# Patient Record
Sex: FEMALE | Race: WHITE | Employment: FULL TIME | URBAN - METROPOLITAN AREA
[De-identification: names, ages, dates, MRNs, and addresses within clinical notes are randomized per-mention and may not be internally consistent; named-entity substitution may affect disease eponyms.]

---

## 2018-09-21 PROBLEM — Z34.00 SUPERVISION OF NORMAL FIRST PREGNANCY, ANTEPARTUM: Status: ACTIVE | Noted: 2018-09-21

## 2018-09-21 PROCEDURE — 86901 BLOOD TYPING SEROLOGIC RH(D): CPT | Performed by: OBSTETRICS & GYNECOLOGY

## 2018-09-21 PROCEDURE — 86780 TREPONEMA PALLIDUM: CPT | Performed by: OBSTETRICS & GYNECOLOGY

## 2018-09-21 PROCEDURE — 87086 URINE CULTURE/COLONY COUNT: CPT | Performed by: OBSTETRICS & GYNECOLOGY

## 2018-09-21 PROCEDURE — 86850 RBC ANTIBODY SCREEN: CPT | Performed by: OBSTETRICS & GYNECOLOGY

## 2018-09-21 PROCEDURE — 86900 BLOOD TYPING SEROLOGIC ABO: CPT | Performed by: OBSTETRICS & GYNECOLOGY

## 2018-09-21 PROCEDURE — 86762 RUBELLA ANTIBODY: CPT | Performed by: OBSTETRICS & GYNECOLOGY

## 2018-09-21 PROCEDURE — 87389 HIV-1 AG W/HIV-1&-2 AB AG IA: CPT | Performed by: OBSTETRICS & GYNECOLOGY

## 2018-12-27 PROBLEM — O35.8XX0 PYELECTASIS OF FETUS ON PRENATAL ULTRASOUND: Status: ACTIVE | Noted: 2018-12-27

## 2018-12-27 PROBLEM — O34.40 HISTORY OF LEEP (LOOP ELECTROSURGICAL EXCISION PROCEDURE) OF CERVIX COMPLICATING PREGNANCY: Status: ACTIVE | Noted: 2018-12-27

## 2018-12-27 PROBLEM — Z34.80 SUPERVISION OF OTHER NORMAL PREGNANCY, ANTEPARTUM: Status: ACTIVE | Noted: 2018-09-21

## 2018-12-27 PROBLEM — Z98.890 HISTORY OF LEEP (LOOP ELECTROSURGICAL EXCISION PROCEDURE) OF CERVIX COMPLICATING PREGNANCY: Status: ACTIVE | Noted: 2018-12-27

## 2019-02-22 PROCEDURE — 87389 HIV-1 AG W/HIV-1&-2 AB AG IA: CPT | Performed by: OBSTETRICS & GYNECOLOGY

## 2019-04-01 PROBLEM — O99.013 ANEMIA AFFECTING PREGNANCY IN THIRD TRIMESTER: Status: ACTIVE | Noted: 2019-04-01

## 2019-04-08 PROCEDURE — 87081 CULTURE SCREEN ONLY: CPT | Performed by: OBSTETRICS & GYNECOLOGY

## 2019-04-08 PROCEDURE — 87653 STREP B DNA AMP PROBE: CPT | Performed by: OBSTETRICS & GYNECOLOGY

## 2019-04-30 PROBLEM — Z98.890 HISTORY OF LEEP (LOOP ELECTROSURGICAL EXCISION PROCEDURE) OF CERVIX COMPLICATING PREGNANCY: Status: RESOLVED | Noted: 2018-12-27 | Resolved: 2019-04-27

## 2019-04-30 PROBLEM — O35.8XX0 PYELECTASIS OF FETUS ON PRENATAL ULTRASOUND: Status: RESOLVED | Noted: 2018-12-27 | Resolved: 2019-04-27

## 2019-04-30 PROBLEM — Z34.80 SUPERVISION OF OTHER NORMAL PREGNANCY, ANTEPARTUM: Status: RESOLVED | Noted: 2018-09-21 | Resolved: 2019-04-27

## 2019-04-30 PROBLEM — O99.013 ANEMIA AFFECTING PREGNANCY IN THIRD TRIMESTER: Status: RESOLVED | Noted: 2019-04-01 | Resolved: 2019-04-27

## 2019-04-30 PROBLEM — O34.40 HISTORY OF LEEP (LOOP ELECTROSURGICAL EXCISION PROCEDURE) OF CERVIX COMPLICATING PREGNANCY: Status: RESOLVED | Noted: 2018-12-27 | Resolved: 2019-04-27

## 2019-10-03 NOTE — PATIENT INSTRUCTIONS
Thank you for choosing Emily Leonard PA-C at [place] [address]  To Do: Kiara Miller  1. Pt to begin medications as prescribed  2. Referral for [place]  3.  Follow-up in 1 month, sooner if problems      • Please signup for [employer], which is [employer] company approved your testing, please call Central Scheduling at [phone]  Please allow our office 5 business days to contact you regarding any testing results.     Refill policies:   Allow 3 business days for refills; controlled substances may take spring

## 2019-10-03 NOTE — PROGRESS NOTES
[facility] Internal Medicine    New Patient Office Visit    CC:  Patient presents with: Anxiety  Depression      HPI:   Patient is a new patient to our office. She is a 51-year-old female here to discuss her anxiety.   She states that she has ha fidgety or restless that you have been moving around a lot more than usual: Not at all  9.  Thoughts that you would be better off dead, or of hurting yourself in some way: Not at all  PHQ-9 TOTAL SCORE: 15  If you checked off any problems, how difficult hav /78   Temp 97.8 °F (36.6 °C)   Resp 14   Ht 70\"   Wt 147 lb (66.7 kg)   BMI 21.09 kg/m²   Physical Exam   Physical Exam   Constitutional: She is oriented to person, place, and time and well-developed, well-nourished, and in no distress.    HE is to call with any side effects or complications from the treatments as a result of today.     Reviewed Past Medical History and   Patient Active Problem List:  (none) - all problems resolved or deleted      No orders of the defined types were placed in th

## 2019-10-29 RX ORDER — FLUOXETINE 10 MG/1
CAPSULE ORAL
Qty: 90 CAPSULE | Refills: 1 | OUTPATIENT
Start: 2019-10-29

## 2019-10-29 NOTE — TELEPHONE ENCOUNTER
Requested Prescriptions     Pending Prescriptions Disp Refills   • FLUOXETINE HCL 10 MG Oral Cap [Pharmacy Med Name: FLUOXETINE 10MG CAPSULES] 90 capsule 1     Sig: TAKE 1 CAPSULE(10 MG) BY MOUTH DAILY     Non protocol medication    LOV: 10/3/2019  RTC: 1

## 2019-11-04 ENCOUNTER — TELEPHONE (OUTPATIENT)
Dept: FAMILY MEDICINE CLINIC | Facility: CLINIC | Age: 29
End: 2019-11-04